# Patient Record
Sex: FEMALE | Race: WHITE | NOT HISPANIC OR LATINO
[De-identification: names, ages, dates, MRNs, and addresses within clinical notes are randomized per-mention and may not be internally consistent; named-entity substitution may affect disease eponyms.]

---

## 2020-02-05 ENCOUNTER — RESULT REVIEW (OUTPATIENT)
Age: 45
End: 2020-02-05

## 2020-09-15 ENCOUNTER — APPOINTMENT (OUTPATIENT)
Dept: COLORECTAL SURGERY | Facility: CLINIC | Age: 45
End: 2020-09-15

## 2021-07-07 ENCOUNTER — APPOINTMENT (OUTPATIENT)
Dept: COLORECTAL SURGERY | Facility: CLINIC | Age: 46
End: 2021-07-07
Payer: COMMERCIAL

## 2021-07-07 ENCOUNTER — TRANSCRIPTION ENCOUNTER (OUTPATIENT)
Age: 46
End: 2021-07-07

## 2021-07-07 VITALS — RESPIRATION RATE: 14 BRPM | TEMPERATURE: 97.4 F | WEIGHT: 141 LBS | BODY MASS INDEX: 24.07 KG/M2 | HEIGHT: 64 IN

## 2021-07-07 DIAGNOSIS — Z78.9 OTHER SPECIFIED HEALTH STATUS: ICD-10-CM

## 2021-07-07 DIAGNOSIS — Z86.39 PERSONAL HISTORY OF OTHER ENDOCRINE, NUTRITIONAL AND METABOLIC DISEASE: ICD-10-CM

## 2021-07-07 DIAGNOSIS — K64.8 OTHER HEMORRHOIDS: ICD-10-CM

## 2021-07-07 PROCEDURE — 99072 ADDL SUPL MATRL&STAF TM PHE: CPT

## 2021-07-07 PROCEDURE — 99244 OFF/OP CNSLTJ NEW/EST MOD 40: CPT

## 2021-07-07 NOTE — HISTORY OF PRESENT ILLNESS
[FreeTextEntry1] : Consultation requested by Dr. Coffey for hemorrhoids. 46-year-old female with internal hemorrhoids. Symptoms have been present for several years. Also has family history of colon cancer uncle recently diagnosed with colon cancer

## 2021-07-07 NOTE — ASSESSMENT
[FreeTextEntry1] : 46-year-old female with hemorrhoids and family history of colon cancer. Recommend colonoscopy. Risks and benefits of colonoscopy have been discussed which include but not limited to bleeding, perforation, missing a cancer or polyp occurring 5%.\par Recommend high fiber diet, Metamucil daily, sitz baths, stool softeners, pain medications p.r.n.

## 2021-07-07 NOTE — PHYSICAL EXAM
[Abdomen Masses] : No abdominal masses [Abdomen Tenderness] : ~T No ~M abdominal tenderness [Tender] : nontender [JVD] : no jugular venous distention  [Normal Breath Sounds] : Normal breath sounds [Normal Heart Sounds] : normal heart sounds [Normal Rate and Rhythm] : normal rate and rhythm [No Rash or Lesion] : No rash or lesion [Alert] : alert [Oriented to Person] : oriented to person [Oriented to Place] : oriented to place [Oriented to Time] : oriented to time [Calm] : calm [de-identified] : Looks well in no distress, of stated age. [de-identified] : pupils equal reactive to light normocephalic atraumatic. [de-identified] : moves all 4 extremities appropriately with 5 over 5 strength

## 2021-07-07 NOTE — CONSULT LETTER
[Dear  ___] : Dear  [unfilled], [Consult Letter:] : I had the pleasure of evaluating your patient, [unfilled]. [Please see my note below.] : Please see my note below. [Consult Closing:] : Thank you very much for allowing me to participate in the care of this patient.  If you have any questions, please do not hesitate to contact me. [Sincerely,] : Sincerely, [FreeTextEntry3] : Darrian Hall M.D. FACS, FASCRS

## 2021-09-29 ENCOUNTER — LABORATORY RESULT (OUTPATIENT)
Age: 46
End: 2021-09-29

## 2021-09-30 ENCOUNTER — APPOINTMENT (OUTPATIENT)
Dept: DISASTER EMERGENCY | Facility: CLINIC | Age: 46
End: 2021-09-30

## 2021-10-05 ENCOUNTER — APPOINTMENT (OUTPATIENT)
Dept: COLORECTAL SURGERY | Facility: CLINIC | Age: 46
End: 2021-10-05
Payer: COMMERCIAL

## 2021-10-05 PROCEDURE — 45378 DIAGNOSTIC COLONOSCOPY: CPT

## 2022-05-31 ENCOUNTER — RESULT REVIEW (OUTPATIENT)
Age: 47
End: 2022-05-31

## 2023-02-08 ENCOUNTER — APPOINTMENT (OUTPATIENT)
Dept: OBGYN | Facility: CLINIC | Age: 48
End: 2023-02-08
Payer: COMMERCIAL

## 2023-02-08 VITALS
WEIGHT: 140 LBS | DIASTOLIC BLOOD PRESSURE: 72 MMHG | BODY MASS INDEX: 24.5 KG/M2 | HEIGHT: 63.5 IN | SYSTOLIC BLOOD PRESSURE: 124 MMHG

## 2023-02-08 DIAGNOSIS — N83.201 UNSPECIFIED OVARIAN CYST, RIGHT SIDE: ICD-10-CM

## 2023-02-08 DIAGNOSIS — E03.9 HYPOTHYROIDISM, UNSPECIFIED: ICD-10-CM

## 2023-02-08 DIAGNOSIS — Z78.9 OTHER SPECIFIED HEALTH STATUS: ICD-10-CM

## 2023-02-08 DIAGNOSIS — Z84.89 FAMILY HISTORY OF OTHER SPECIFIED CONDITIONS: ICD-10-CM

## 2023-02-08 DIAGNOSIS — Z81.8 FAMILY HISTORY OF OTHER MENTAL AND BEHAVIORAL DISORDERS: ICD-10-CM

## 2023-02-08 DIAGNOSIS — Z87.891 PERSONAL HISTORY OF NICOTINE DEPENDENCE: ICD-10-CM

## 2023-02-08 DIAGNOSIS — Z83.438 FAMILY HISTORY OF OTHER DISORDER OF LIPOPROTEIN METABOLISM AND OTHER LIPIDEMIA: ICD-10-CM

## 2023-02-08 DIAGNOSIS — Z80.0 FAMILY HISTORY OF MALIGNANT NEOPLASM OF DIGESTIVE ORGANS: ICD-10-CM

## 2023-02-08 DIAGNOSIS — Z82.49 FAMILY HISTORY OF ISCHEMIC HEART DISEASE AND OTHER DISEASES OF THE CIRCULATORY SYSTEM: ICD-10-CM

## 2023-02-08 DIAGNOSIS — Z80.41 FAMILY HISTORY OF MALIGNANT NEOPLASM OF OVARY: ICD-10-CM

## 2023-02-08 DIAGNOSIS — Z82.5 FAMILY HISTORY OF ASTHMA AND OTHER CHRONIC LOWER RESPIRATORY DISEASES: ICD-10-CM

## 2023-02-08 DIAGNOSIS — Z00.00 ENCOUNTER FOR GENERAL ADULT MEDICAL EXAMINATION W/OUT ABNORMAL FINDINGS: ICD-10-CM

## 2023-02-08 LAB
HCG UR QL: NEGATIVE
QUALITY CONTROL: YES

## 2023-02-08 PROCEDURE — 81025 URINE PREGNANCY TEST: CPT

## 2023-02-08 PROCEDURE — 99205 OFFICE O/P NEW HI 60 MIN: CPT

## 2023-02-08 RX ORDER — UBIDECARENONE 200 MG
CAPSULE ORAL
Refills: 0 | Status: ACTIVE | COMMUNITY

## 2023-02-08 RX ORDER — LEVOTHYROXINE SODIUM 25 UG/1
25 TABLET ORAL
Refills: 0 | Status: ACTIVE | COMMUNITY

## 2023-02-08 RX ORDER — PRAVASTATIN SODIUM 10 MG/1
10 TABLET ORAL
Refills: 0 | Status: ACTIVE | COMMUNITY

## 2023-02-09 PROBLEM — N83.201 CYST OF RIGHT OVARY: Status: ACTIVE | Noted: 2023-02-09

## 2023-02-09 NOTE — REASON FOR VISIT
[Initial] : an initial consultation for [FreeTextEntry2] : abnormal bleeding [FreeTextEntry1] : a friend

## 2023-02-09 NOTE — DISCUSSION/SUMMARY
[FreeTextEntry1] : 49 yo with heavy menstrual bleeding/dysmenorrhea/fibroids after endometrial ablation. Right ovarian cyst likely follicular cyst. Prior EMBx insufficient. \par -DIscussed need for evaluation of endometrium via office hysteroscopy EMBx with misoprostol vs in OR. Pt states she will be unable to tolerate another office procedure and requests in OR. \par -Discussed options for management of her symptoms including OCP, patch, ring, Depo, progesterone IUD (if able to successfully enter cavity), UAE, and hysterectomy. Pt desires hysterectomy. DIscussed risks and benefits of retention vs removal of cervix, retention vs removal of tubes, retention vs removal of ovaries. She desires retention of cervix. Based on her exam, she is a candidate for minimally invasive approach, specifically laparoscopic. \par -Pt would like biopsy to be performed at the same time as hysterectomy so she does not need two procedures. We discussed the risks and benefits of doing this and getting frozen section before proceeding with hysterectomy. We discussed the low risk of cancer which would require us to terminate the procedure. We also discussed the risk of an inaccurate result or need for subsequent surgery based on final pathology. She understands thjs and would like to proceed. \par -Discussed tentative plan for pelvic EUA, dilation and curettage, laparoscopic supracervical hysterectomy, bilateral salpingectomy, cystoscopy, all indicated procedures. Plan for frozen section intraoperatively.\par -SHe will be contacted with a surgical date\par -Pelvic ultrasound ordered for further evaluation prior to surgery\par -Needs medical clearance

## 2023-02-09 NOTE — HISTORY OF PRESENT ILLNESS
[Excessive Bleeding] : bleeding has been excessive [Y] : Patient uses contraception [Vasectomy (partner)] : has a partner with a vasectomy [Menarche Age: ____] : age at menarche was [unfilled] [Currently Active] : currently active [Men] : men [Patient reported mammogram was normal] : Patient reported mammogram was normal [Patient reported PAP Smear was normal] : Patient reported PAP Smear was normal [Patient reported colonoscopy was normal] : Patient reported colonoscopy was normal [Monogamous (Male Partner)] : is monogamous with a male partner [TextBox_4] : Patient presents for post menopausal bleeding, x 3 vaginal deliveries. [Mammogramdate] : 05/2022 [TextBox_25] : Guided Core Biopsy *right breast* [PapSmeardate] : 03/2022 [ColonoscopyDate] : 10/2021 [LMPDate] : 2/6/23 [MensesFreq] : 8 [MensesLength] : 28 [MensesAmount] : heavy [PGHxTotal] : 4 [[facility] Hospit[facility]Term] : 3 [[facility] Centeriving] : 3 [PGHxABInduced] : 1 [FreeTextEntry1] : NSVDx3. ETOPx1. +h/o cysts and fibroids. Remote hx of abnormal pap. Denies STI.

## 2023-02-09 NOTE — PHYSICAL EXAM
[Chaperone Present] : A chaperone was present in the examining room during all aspects of the physical examination [Appropriately responsive] : appropriately responsive [Alert] : alert [No Acute Distress] : no acute distress [No Lymphadenopathy] : no lymphadenopathy [Soft] : soft [Non-tender] : non-tender [Non-distended] : non-distended [No HSM] : No HSM [No Lesions] : no lesions [No Mass] : no mass [Oriented x3] : oriented x3 [FreeTextEntry1] : SHANON Morley  [Labia Majora] : normal [Labia Minora] : normal [Normal] : normal [Tenderness] : nontender [Enlarged ___ wks] : not enlarged [Uterine Adnexae] : normal

## 2023-03-13 ENCOUNTER — NON-APPOINTMENT (OUTPATIENT)
Age: 48
End: 2023-03-13

## 2023-03-14 ENCOUNTER — APPOINTMENT (OUTPATIENT)
Dept: ULTRASOUND IMAGING | Facility: CLINIC | Age: 48
End: 2023-03-14
Payer: COMMERCIAL

## 2023-03-14 PROCEDURE — 76856 US EXAM PELVIC COMPLETE: CPT

## 2023-03-14 PROCEDURE — 76830 TRANSVAGINAL US NON-OB: CPT

## 2023-03-15 ENCOUNTER — APPOINTMENT (OUTPATIENT)
Dept: OBGYN | Facility: CLINIC | Age: 48
End: 2023-03-15
Payer: COMMERCIAL

## 2023-03-15 VITALS
HEIGHT: 63.5 IN | DIASTOLIC BLOOD PRESSURE: 78 MMHG | SYSTOLIC BLOOD PRESSURE: 128 MMHG | WEIGHT: 142 LBS | BODY MASS INDEX: 24.85 KG/M2

## 2023-03-15 DIAGNOSIS — N94.6 DYSMENORRHEA, UNSPECIFIED: ICD-10-CM

## 2023-03-15 DIAGNOSIS — N92.0 EXCESSIVE AND FREQUENT MENSTRUATION WITH REGULAR CYCLE: ICD-10-CM

## 2023-03-15 DIAGNOSIS — D21.9 BENIGN NEOPLASM OF CONNECTIVE AND OTHER SOFT TISSUE, UNSPECIFIED: ICD-10-CM

## 2023-03-15 PROCEDURE — 99214 OFFICE O/P EST MOD 30 MIN: CPT

## 2023-03-15 RX ORDER — DOCUSATE SODIUM 100 MG/1
100 CAPSULE ORAL TWICE DAILY
Qty: 60 | Refills: 0 | Status: ACTIVE | COMMUNITY
Start: 2023-03-15 | End: 1900-01-01

## 2023-03-15 RX ORDER — OXYCODONE 5 MG/1
5 TABLET ORAL
Qty: 15 | Refills: 0 | Status: ACTIVE | COMMUNITY
Start: 2023-03-15 | End: 1900-01-01

## 2023-03-21 PROBLEM — N92.0 HEAVY MENSTRUAL BLEEDING: Status: ACTIVE | Noted: 2023-02-09

## 2023-03-21 PROBLEM — N94.6 DYSMENORRHEA: Status: ACTIVE | Noted: 2023-02-09

## 2023-03-21 PROBLEM — D21.9 FIBROIDS: Status: ACTIVE | Noted: 2023-02-09

## 2023-03-21 NOTE — HISTORY OF PRESENT ILLNESS
[FreeTextEntry1] : Pt here for follow-up after ultrasound and surgical planning. No changes since last visit. \par \par Pelvic ultrasound with EE 5mm, posterior 2cm myoma, heterogeneous myometrium which may be mild adenomyosis, ovaries normal with 12mm follicle. \par \par Images reviewed myself and findings reviewed with pt. Discussed that ROV cyst has resolved.\par \par Discussed surgical plan for pelvic exam under anesthesia, dilation and curettage, laparoscopic supracervical hysterectomy, bilateral salpingectomy, cystoscopy, possible robotic assistance, all indicated procedures.. Discussed risks to include, but are not limited to, bleeding, possible transfusion, infection, fistula, damage to nearby organs, vessels, or nerves resulting in temporary or permanent injury, deep venous thrombosis, and perioperative death. Discussed risk of laparotomy if unable to have adequate visualization to complete using minimally invasive approach. We reviewed the planned location of the incisions. \par \par Discussed the need for contained extraction of the uterus in a bag when using a minimally invasive approach. We discussed that this is done with the specimens in a bag using a technique called hand morcellation with a scalpel. We discussed that morcellation is done through one of the abdominal incisions. We discussed risk of undiagnosed uterine carcinoma which is rare. Her endometrial biopsy was insufficient. We discussed that we will be doing a dilation and curettage and sending frozen section prior to proceeding with hysterectomy. If cancer is found, the procedure will be terminated. If unable to gain access to the uterine cavity, we discussed proceeding with surgery given her symptoms.  We discussed the risk of hand morcellation of a uterus containing carcinoma which could spread cancerous cells if there are possible leaks in the bag during morcellation at the time of the surgery, which could spread disease and worsen prognosis. We discussed that we take all precautions to minimize the risk of leaks in the bag to minimize this subsequent risk. We discussed the risks and benefits of minimally invasive surgery vs open abdominal hysterectomy. She understands these risks and benefits and wishes to proceed with minimally invasive surgery. We discussed that in the rare event her final pathology reveals cancer, she would need a subsequent surgery or treatment.\par \par She also understands the limitations of laparoscopic surgery and the possibility of missing a surgical complication with need for subsequent re-exploration. \par \par Postoperative pain management was reviewed with plan for alternating Tylenol and Motrin with Oxycodone for breakthrough pain. She plans to minimize taking Motrin given prior gastric sleeve.  Discussed the benefit of ice packs x first 24 hours.  Discussed postoperative expectations and restrictions. \par \par We reviewed that there will be physician assistants and nurse practitioners in the operating room who may assist in the pelvic exam for learning purposes and who will be assisting in the surgery. \par \par She agrees to proceed. All of her questions were answered to her satisfaction.

## 2023-03-21 NOTE — DISCUSSION/SUMMARY
[FreeTextEntry1] : 49 yo with heavy menstrual bleeding/dysmenorrhea/fibroids after endometrial ablation desiring definitive management in the form of hysterectomy. Prior EMBx insufficent. \par -pelvic exam under anesthesia, dilation and curettage, laparoscopic supracervical hysterectomy, bilateral salpingectomy, cystoscopy, possible robotic assistance, all indicated procedures\par -Postoperative expectations and restrictions reviewed\par -Rx Oxycodone/colace sent, pt to have OTC Motrin/Tylenol at home\par -All questions answered to her satisfaction\par -Pt has appt for medical clearance and pst\par

## 2023-03-28 ENCOUNTER — RESULT REVIEW (OUTPATIENT)
Age: 48
End: 2023-03-28

## 2023-03-28 ENCOUNTER — APPOINTMENT (OUTPATIENT)
Dept: OBGYN | Facility: HOSPITAL | Age: 48
End: 2023-03-28

## 2023-04-19 ENCOUNTER — APPOINTMENT (OUTPATIENT)
Dept: OBGYN | Facility: CLINIC | Age: 48
End: 2023-04-19
Payer: COMMERCIAL

## 2023-04-19 VITALS
SYSTOLIC BLOOD PRESSURE: 124 MMHG | HEIGHT: 63.5 IN | BODY MASS INDEX: 25.2 KG/M2 | DIASTOLIC BLOOD PRESSURE: 72 MMHG | WEIGHT: 144 LBS

## 2023-04-19 DIAGNOSIS — Z09 ENCOUNTER FOR FOLLOW-UP EXAMINATION AFTER COMPLETED TREATMENT FOR CONDITIONS OTHER THAN MALIGNANT NEOPLASM: ICD-10-CM

## 2023-04-19 PROCEDURE — 99024 POSTOP FOLLOW-UP VISIT: CPT

## 2023-04-19 NOTE — DISCUSSION/SUMMARY
[FreeTextEntry1] : 47 yo s/p dilation and curettage,  laparoscopic supracervical hysterectomy, bilateral salpingectomy 3/28 here for postop visit doing well postoperatively. \par -Postoperative restrictions to continue until 6 weeks\par -Resume usual GYN care

## 2023-04-19 NOTE — PHYSICAL EXAM
[Chaperone Present] : A chaperone was present in the examining room during all aspects of the physical examination [FreeTextEntry1] : SHANON Morley  [Appropriately responsive] : appropriately responsive [Alert] : alert [No Acute Distress] : no acute distress [Soft] : soft [Non-tender] : non-tender [Non-distended] : non-distended [No HSM] : No HSM [No Lesions] : no lesions [No Mass] : no mass [Oriented x3] : oriented x3 [FreeTextEntry7] : incisions c/d/i without erythema/drainage

## 2023-04-19 NOTE — HISTORY OF PRESENT ILLNESS
[FreeTextEntry1] : 49 yo s/p dilation and curettage,  laparoscopic supracervical hysterectomy, bilateral salpingectomy 3/28 here for postop visit. Pt feeling well. Denies pain. Had light bleeding x 1 wk then resolved. Tolerating regular diet, ambulating, voiding, normal BM. \par \par Pathology: benign endomerium, fibroids, normal tubes. \par \par Intraoperative and pathology findings reviewed with pt, all questions answerd.

## 2023-06-02 ENCOUNTER — NON-APPOINTMENT (OUTPATIENT)
Age: 48
End: 2023-06-02

## 2024-04-22 ENCOUNTER — APPOINTMENT (OUTPATIENT)
Dept: OBGYN | Facility: CLINIC | Age: 49
End: 2024-04-22

## 2025-08-27 ENCOUNTER — APPOINTMENT (OUTPATIENT)
Dept: ORTHOPEDIC SURGERY | Facility: CLINIC | Age: 50
End: 2025-08-27

## 2025-08-27 VITALS — HEIGHT: 63 IN | WEIGHT: 150 LBS | BODY MASS INDEX: 26.58 KG/M2

## 2025-08-27 DIAGNOSIS — S39.012A STRAIN OF MUSCLE, FASCIA AND TENDON OF LOWER BACK, INITIAL ENCOUNTER: ICD-10-CM

## 2025-08-27 DIAGNOSIS — M54.50 LOW BACK PAIN, UNSPECIFIED: ICD-10-CM

## 2025-08-27 PROCEDURE — 99204 OFFICE O/P NEW MOD 45 MIN: CPT

## 2025-08-27 PROCEDURE — 72100 X-RAY EXAM L-S SPINE 2/3 VWS: CPT

## 2025-08-27 RX ORDER — CYCLOBENZAPRINE 10 MG/1
10 TABLET ORAL
Refills: 0 | Status: ACTIVE | COMMUNITY

## 2025-08-27 RX ORDER — KETOROLAC TROMETHAMINE 10 MG
10 TABLET ORAL
Refills: 0 | Status: ACTIVE | COMMUNITY

## 2025-08-27 RX ORDER — METHOCARBAMOL 500 MG/1
500 TABLET, FILM COATED ORAL
Refills: 0 | Status: ACTIVE | COMMUNITY

## 2025-09-08 ENCOUNTER — APPOINTMENT (OUTPATIENT)
Dept: NEUROSURGERY | Facility: CLINIC | Age: 50
End: 2025-09-08
Payer: COMMERCIAL

## 2025-09-08 DIAGNOSIS — M51.26 OTHER INTERVERTEBRAL DISC DISPLACEMENT, LUMBAR REGION: ICD-10-CM

## 2025-09-08 DIAGNOSIS — S39.012A STRAIN OF MUSCLE, FASCIA AND TENDON OF LOWER BACK, INITIAL ENCOUNTER: ICD-10-CM

## 2025-09-08 PROCEDURE — 99204 OFFICE O/P NEW MOD 45 MIN: CPT

## 2025-09-19 ENCOUNTER — APPOINTMENT (OUTPATIENT)
Dept: OBGYN | Facility: CLINIC | Age: 50
End: 2025-09-19
Payer: COMMERCIAL

## 2025-09-19 ENCOUNTER — NON-APPOINTMENT (OUTPATIENT)
Age: 50
End: 2025-09-19

## 2025-09-19 LAB
BILIRUB UR QL STRIP: NORMAL
CLARITY UR: CLEAR
COLLECTION METHOD: NORMAL
GLUCOSE UR-MCNC: NORMAL
HCG UR QL: 0.2 EU/DL
HGB UR QL STRIP.AUTO: NORMAL
KETONES UR-MCNC: NORMAL
LEUKOCYTE ESTERASE UR QL STRIP: NORMAL
NITRITE UR QL STRIP: NORMAL
PH UR STRIP: 7
PROT UR STRIP-MCNC: NORMAL
SP GR UR STRIP: 1.01

## 2025-09-19 PROCEDURE — 99215 OFFICE O/P EST HI 40 MIN: CPT

## 2025-09-19 PROCEDURE — 99459 PELVIC EXAMINATION: CPT

## 2025-09-19 PROCEDURE — 81003 URINALYSIS AUTO W/O SCOPE: CPT | Mod: QW
